# Patient Record
Sex: FEMALE | Race: WHITE | NOT HISPANIC OR LATINO | Employment: FULL TIME | URBAN - METROPOLITAN AREA
[De-identification: names, ages, dates, MRNs, and addresses within clinical notes are randomized per-mention and may not be internally consistent; named-entity substitution may affect disease eponyms.]

---

## 2023-06-12 ENCOUNTER — OFFICE VISIT (OUTPATIENT)
Dept: PRIMARY CARE | Facility: CLINIC | Age: 33
End: 2023-06-12
Payer: COMMERCIAL

## 2023-06-12 VITALS
HEIGHT: 65 IN | DIASTOLIC BLOOD PRESSURE: 71 MMHG | WEIGHT: 139 LBS | SYSTOLIC BLOOD PRESSURE: 114 MMHG | BODY MASS INDEX: 23.16 KG/M2 | HEART RATE: 41 BPM | OXYGEN SATURATION: 98 %

## 2023-06-12 DIAGNOSIS — G80.9 CEREBRAL PALSY, UNSPECIFIED TYPE (MULTI): Primary | ICD-10-CM

## 2023-06-12 DIAGNOSIS — F41.9 ANXIETY: ICD-10-CM

## 2023-06-12 PROCEDURE — 99214 OFFICE O/P EST MOD 30 MIN: CPT | Performed by: FAMILY MEDICINE

## 2023-06-12 RX ORDER — CLONAZEPAM 0.5 MG/1
0.5 TABLET ORAL 2 TIMES DAILY
COMMUNITY
End: 2023-06-12 | Stop reason: SDUPTHER

## 2023-06-12 RX ORDER — CLONAZEPAM 0.5 MG/1
0.5 TABLET ORAL 2 TIMES DAILY
Qty: 180 TABLET | Refills: 2 | Status: SHIPPED | OUTPATIENT
Start: 2023-06-12 | End: 2024-02-20 | Stop reason: SDUPTHER

## 2023-06-12 ASSESSMENT — ENCOUNTER SYMPTOMS
NUMBNESS: 0
RESPIRATORY NEGATIVE: 1
HEADACHES: 0
TREMORS: 0
CARDIOVASCULAR NEGATIVE: 1
SEIZURES: 0
CONSTITUTIONAL NEGATIVE: 1
WEAKNESS: 1
LIGHT-HEADEDNESS: 0
SPEECH DIFFICULTY: 0
GASTROINTESTINAL NEGATIVE: 1
FACIAL ASYMMETRY: 0
MUSCULOSKELETAL NEGATIVE: 1
PSYCHIATRIC NEGATIVE: 1

## 2023-06-12 NOTE — PROGRESS NOTES
Subjective   Patient ID: Kaylah Allen is a 32 y.o. female.    HPI  Patient has a longstanding history of anxiety we have been treating her with Klonopin for years patient is doing quite well continues to function and thrive as a young mother.  No new complaints.    Patient has a history of CP in the past is little concerned that she is losing some muscle strength however to send her over for neurologic evaluation  Review of Systems   Constitutional: Negative.    HENT: Negative.     Respiratory: Negative.     Cardiovascular: Negative.    Gastrointestinal: Negative.    Genitourinary: Negative.    Musculoskeletal: Negative.    Neurological:  Positive for weakness. Negative for tremors, seizures, syncope, facial asymmetry, speech difficulty, light-headedness, numbness and headaches.   Psychiatric/Behavioral: Negative.         Objective   Physical Exam  General no acute process no icterus well-hydrated alert active oriented    HEENT normocephalic no palpable tenderness eyes pupils equal reactive light and accommodation extraocular muscles intact no icterus and/or erythema ears benign external auditory canal no gross deformities nose no discharge drainage erythema bleeding throat no erythema.    Heart regular rate and rhythm without S3-S4 or murmur    Lungs clear to auscultation x2 no rales or rhonchi    Abdomen soft nontender nondistended no palpable masses no organomegaly splenomegaly.    Integument no rash no lumps bumps or concerning lesions.    Neurologic no tics tremors or seizures no decreased range of motion or ataxia.    Musculoskeletal good range of motion no gross abnormalities noted  Assessment/Plan   Diagnoses and all orders for this visit:  Cerebral palsy, unspecified type (CMS/HCC)  -     Referral to Neurology; Future

## 2023-08-28 ENCOUNTER — LAB (OUTPATIENT)
Dept: LAB | Facility: LAB | Age: 33
End: 2023-08-28
Payer: COMMERCIAL

## 2023-08-28 ENCOUNTER — OFFICE VISIT (OUTPATIENT)
Dept: PRIMARY CARE | Facility: CLINIC | Age: 33
End: 2023-08-28
Payer: COMMERCIAL

## 2023-08-28 VITALS
WEIGHT: 128 LBS | SYSTOLIC BLOOD PRESSURE: 117 MMHG | BODY MASS INDEX: 21.33 KG/M2 | HEIGHT: 65 IN | OXYGEN SATURATION: 98 % | HEART RATE: 84 BPM | DIASTOLIC BLOOD PRESSURE: 80 MMHG

## 2023-08-28 DIAGNOSIS — R53.81 MALAISE AND FATIGUE: ICD-10-CM

## 2023-08-28 DIAGNOSIS — R63.4 WEIGHT LOSS: ICD-10-CM

## 2023-08-28 DIAGNOSIS — R53.81 MALAISE AND FATIGUE: Primary | ICD-10-CM

## 2023-08-28 DIAGNOSIS — R19.7 DIARRHEA, UNSPECIFIED TYPE: ICD-10-CM

## 2023-08-28 DIAGNOSIS — R53.83 MALAISE AND FATIGUE: ICD-10-CM

## 2023-08-28 DIAGNOSIS — R53.83 MALAISE AND FATIGUE: Primary | ICD-10-CM

## 2023-08-28 LAB
ALANINE AMINOTRANSFERASE (SGPT) (U/L) IN SER/PLAS: 10 U/L (ref 7–45)
ALBUMIN (G/DL) IN SER/PLAS: 4.8 G/DL (ref 3.4–5)
ALKALINE PHOSPHATASE (U/L) IN SER/PLAS: 45 U/L (ref 33–110)
AMYLASE (U/L) IN SER/PLAS: 34 U/L (ref 29–103)
ANION GAP IN SER/PLAS: 13 MMOL/L (ref 10–20)
ASPARTATE AMINOTRANSFERASE (SGOT) (U/L) IN SER/PLAS: 13 U/L (ref 9–39)
BASOPHILS (10*3/UL) IN BLOOD BY AUTOMATED COUNT: 0.02 X10E9/L (ref 0–0.1)
BASOPHILS/100 LEUKOCYTES IN BLOOD BY AUTOMATED COUNT: 0.4 % (ref 0–2)
BILIRUBIN DIRECT (MG/DL) IN SER/PLAS: 0.1 MG/DL (ref 0–0.3)
BILIRUBIN TOTAL (MG/DL) IN SER/PLAS: 0.7 MG/DL (ref 0–1.2)
CALCIUM (MG/DL) IN SER/PLAS: 9.8 MG/DL (ref 8.6–10.6)
CARBON DIOXIDE, TOTAL (MMOL/L) IN SER/PLAS: 26 MMOL/L (ref 21–32)
CHLORIDE (MMOL/L) IN SER/PLAS: 103 MMOL/L (ref 98–107)
CREATININE (MG/DL) IN SER/PLAS: 0.71 MG/DL (ref 0.5–1.05)
EOSINOPHILS (10*3/UL) IN BLOOD BY AUTOMATED COUNT: 0.09 X10E9/L (ref 0–0.7)
EOSINOPHILS/100 LEUKOCYTES IN BLOOD BY AUTOMATED COUNT: 1.7 % (ref 0–6)
ERYTHROCYTE DISTRIBUTION WIDTH (RATIO) BY AUTOMATED COUNT: 12.2 % (ref 11.5–14.5)
ERYTHROCYTE MEAN CORPUSCULAR HEMOGLOBIN CONCENTRATION (G/DL) BY AUTOMATED: 33.8 G/DL (ref 32–36)
ERYTHROCYTE MEAN CORPUSCULAR VOLUME (FL) BY AUTOMATED COUNT: 96 FL (ref 80–100)
ERYTHROCYTES (10*6/UL) IN BLOOD BY AUTOMATED COUNT: 4.14 X10E12/L (ref 4–5.2)
GFR FEMALE: >90 ML/MIN/1.73M2
GLUCOSE (MG/DL) IN SER/PLAS: 95 MG/DL (ref 74–99)
HEMATOCRIT (%) IN BLOOD BY AUTOMATED COUNT: 39.9 % (ref 36–46)
HEMOGLOBIN (G/DL) IN BLOOD: 13.5 G/DL (ref 12–16)
IMMATURE GRANULOCYTES/100 LEUKOCYTES IN BLOOD BY AUTOMATED COUNT: 0.2 % (ref 0–0.9)
LEUKOCYTES (10*3/UL) IN BLOOD BY AUTOMATED COUNT: 5.2 X10E9/L (ref 4.4–11.3)
LIPASE (U/L) IN SER/PLAS: 16 U/L (ref 9–82)
LYMPHOCYTES (10*3/UL) IN BLOOD BY AUTOMATED COUNT: 2.21 X10E9/L (ref 1.2–4.8)
LYMPHOCYTES/100 LEUKOCYTES IN BLOOD BY AUTOMATED COUNT: 42.3 % (ref 13–44)
MONOCYTES (10*3/UL) IN BLOOD BY AUTOMATED COUNT: 0.43 X10E9/L (ref 0.1–1)
MONOCYTES/100 LEUKOCYTES IN BLOOD BY AUTOMATED COUNT: 8.2 % (ref 2–10)
NEUTROPHILS (10*3/UL) IN BLOOD BY AUTOMATED COUNT: 2.47 X10E9/L (ref 1.2–7.7)
NEUTROPHILS/100 LEUKOCYTES IN BLOOD BY AUTOMATED COUNT: 47.2 % (ref 40–80)
NRBC (PER 100 WBCS) BY AUTOMATED COUNT: 0 /100 WBC (ref 0–0)
PLATELETS (10*3/UL) IN BLOOD AUTOMATED COUNT: 312 X10E9/L (ref 150–450)
POTASSIUM (MMOL/L) IN SER/PLAS: 4.2 MMOL/L (ref 3.5–5.3)
PROTEIN TOTAL: 7.8 G/DL (ref 6.4–8.2)
SODIUM (MMOL/L) IN SER/PLAS: 138 MMOL/L (ref 136–145)
THYROTROPIN (MIU/L) IN SER/PLAS BY DETECTION LIMIT <= 0.05 MIU/L: 1.45 MIU/L (ref 0.44–3.98)
UREA NITROGEN (MG/DL) IN SER/PLAS: 18 MG/DL (ref 6–23)

## 2023-08-28 PROCEDURE — 84443 ASSAY THYROID STIM HORMONE: CPT

## 2023-08-28 PROCEDURE — 36415 COLL VENOUS BLD VENIPUNCTURE: CPT

## 2023-08-28 PROCEDURE — 80053 COMPREHEN METABOLIC PANEL: CPT

## 2023-08-28 PROCEDURE — 83690 ASSAY OF LIPASE: CPT

## 2023-08-28 PROCEDURE — 85025 COMPLETE CBC W/AUTO DIFF WBC: CPT

## 2023-08-28 PROCEDURE — 82150 ASSAY OF AMYLASE: CPT

## 2023-08-28 PROCEDURE — 99214 OFFICE O/P EST MOD 30 MIN: CPT | Performed by: FAMILY MEDICINE

## 2023-08-28 PROCEDURE — 82248 BILIRUBIN DIRECT: CPT

## 2023-08-28 ASSESSMENT — ENCOUNTER SYMPTOMS
CARDIOVASCULAR NEGATIVE: 1
VOMITING: 0
MUSCULOSKELETAL NEGATIVE: 1
UNEXPECTED WEIGHT CHANGE: 1
RESPIRATORY NEGATIVE: 1
NERVOUS/ANXIOUS: 1
FATIGUE: 1
ABDOMINAL PAIN: 1
DIARRHEA: 1
EYES NEGATIVE: 1
BLOOD IN STOOL: 1
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
## 2023-09-17 PROBLEM — F32.A DEPRESSIVE DISORDER: Status: ACTIVE | Noted: 2019-04-15

## 2023-09-17 PROBLEM — R00.2 PALPITATION: Status: ACTIVE | Noted: 2023-09-17

## 2023-09-17 PROBLEM — Q17.9 EAR DEFORMITY: Status: ACTIVE | Noted: 2019-03-21

## 2023-09-17 PROBLEM — R06.00 DYSPNEA: Status: ACTIVE | Noted: 2023-09-17

## 2023-09-17 PROBLEM — R00.1 BRADYCARDIA, SINUS: Status: ACTIVE | Noted: 2023-09-17

## 2023-09-17 PROBLEM — M54.2 SPINE PAIN, CERVICAL: Status: ACTIVE | Noted: 2023-09-17

## 2023-09-17 PROBLEM — R13.10 DYSPHAGIA: Status: ACTIVE | Noted: 2023-09-17

## 2023-09-17 PROBLEM — F41.9 ANXIETY DISORDER: Status: ACTIVE | Noted: 2023-09-17

## 2023-09-17 PROBLEM — D22.9 NUMEROUS SKIN MOLES: Status: ACTIVE | Noted: 2022-01-12

## 2023-09-17 PROBLEM — F41.1 ANXIETY STATE: Status: ACTIVE | Noted: 2019-04-15

## 2023-09-17 PROBLEM — R53.83 FATIGUE: Status: ACTIVE | Noted: 2022-01-12

## 2023-09-17 PROBLEM — M41.9 SCOLIOSIS: Status: ACTIVE | Noted: 2023-09-17

## 2023-09-17 PROBLEM — K25.9 GASTRIC ULCER WITHOUT HEMORRHAGE, PERFORATION, OR OBSTRUCTION: Status: ACTIVE | Noted: 2019-04-15

## 2023-09-17 RX ORDER — MULTIVITAMIN
TABLET ORAL
COMMUNITY

## 2023-10-23 ENCOUNTER — OFFICE VISIT (OUTPATIENT)
Dept: PRIMARY CARE | Facility: CLINIC | Age: 33
End: 2023-10-23
Payer: COMMERCIAL

## 2023-10-23 VITALS
WEIGHT: 152.6 LBS | DIASTOLIC BLOOD PRESSURE: 76 MMHG | SYSTOLIC BLOOD PRESSURE: 119 MMHG | BODY MASS INDEX: 25.39 KG/M2 | HEART RATE: 77 BPM | OXYGEN SATURATION: 98 % | TEMPERATURE: 97.9 F

## 2023-10-23 DIAGNOSIS — R10.9 FLANK PAIN: Primary | ICD-10-CM

## 2023-10-23 DIAGNOSIS — G80.9 CEREBRAL PALSY, UNSPECIFIED TYPE (MULTI): ICD-10-CM

## 2023-10-23 PROCEDURE — 99213 OFFICE O/P EST LOW 20 MIN: CPT | Performed by: FAMILY MEDICINE

## 2023-10-23 ASSESSMENT — PATIENT HEALTH QUESTIONNAIRE - PHQ9
1. LITTLE INTEREST OR PLEASURE IN DOING THINGS: NOT AT ALL
2. FEELING DOWN, DEPRESSED OR HOPELESS: NOT AT ALL
SUM OF ALL RESPONSES TO PHQ9 QUESTIONS 1 AND 2: 0

## 2023-10-23 ASSESSMENT — ENCOUNTER SYMPTOMS
BACK PAIN: 1
GASTROINTESTINAL NEGATIVE: 1
FLANK PAIN: 1
RESPIRATORY NEGATIVE: 1
CONSTITUTIONAL NEGATIVE: 1
CARDIOVASCULAR NEGATIVE: 1

## 2023-10-23 NOTE — PROGRESS NOTES
Subjective   Patient ID: Ania Allen is a 33 y.o. female who presents for Follow-up (Pt following up from Urgent care. No UTI but having kidney pain in her left kidney.).  HPI  Pt with l sided flank pain recurrent uti patient having recurrent flank pain and has had a couple UTIs in the past.  At this point no pyuria hematuria or dysuria.    Patient was at the urgent care this morning evaluation was flank pain today she has some costovertebral angle pain on the right palpable tenderness of the musculoskeletal thoracic muscles on the right.  Patient is concerned with recurrent urinary tract infections we will get a CT scan to look for stones look for any other abnormalities of the kidneys themselves.  Review of Systems   Constitutional: Negative.    HENT: Negative.     Respiratory: Negative.     Cardiovascular: Negative.    Gastrointestinal: Negative.    Genitourinary:  Positive for flank pain.   Musculoskeletal:  Positive for back pain.       Objective   Physical Exam  General no acute process no icterus well-hydrated alert active oriented    HEENT normocephalic no palpable tenderness eyes pupils equal reactive light and accommodation extraocular muscles intact no icterus and/or erythema ears benign external auditory canal no gross deformities nose no discharge drainage erythema bleeding throat no erythema.    Heart regular rate and rhythm without S3-S4 or murmur    Lungs clear to auscultation x2 no rales or rhonchi    Abdomen soft nontender nondistended no palpable masses no organomegaly splenomegaly.    Integument no rash no lumps bumps or concerning lesions.    Neurologic no tics tremors or seizures no decreased range of motion or ataxia.    Musculoskeletal good range of motion no gross abnormalities noted    Patient has some difficulty with left lower extremity secondary to history of CP  Assessment/Plan   Diagnoses and all orders for this visit:  Flank pain  -     CT abdomen pelvis wo IV contrast;  Future  Cerebral palsy, unspecified type (CMS/HCC)

## 2023-11-27 ENCOUNTER — OFFICE VISIT (OUTPATIENT)
Dept: NEUROLOGY | Facility: CLINIC | Age: 33
End: 2023-11-27
Payer: COMMERCIAL

## 2023-11-27 VITALS
HEIGHT: 65 IN | BODY MASS INDEX: 25.33 KG/M2 | TEMPERATURE: 97.8 F | SYSTOLIC BLOOD PRESSURE: 129 MMHG | RESPIRATION RATE: 16 BRPM | WEIGHT: 152 LBS | HEART RATE: 74 BPM | DIASTOLIC BLOOD PRESSURE: 79 MMHG

## 2023-11-27 DIAGNOSIS — R20.0 LEFT SIDED NUMBNESS: Primary | ICD-10-CM

## 2023-11-27 DIAGNOSIS — G81.14 SPASTIC HEMIPARESIS OF LEFT NONDOMINANT SIDE (MULTI): ICD-10-CM

## 2023-11-27 DIAGNOSIS — G80.8 OTHER CEREBRAL PALSY (MULTI): ICD-10-CM

## 2023-11-27 PROCEDURE — 99204 OFFICE O/P NEW MOD 45 MIN: CPT | Performed by: PSYCHIATRY & NEUROLOGY

## 2023-11-27 RX ORDER — BACLOFEN 5 MG/1
5 TABLET ORAL NIGHTLY
Qty: 30 TABLET | Refills: 2 | Status: SHIPPED | OUTPATIENT
Start: 2023-11-27

## 2023-11-27 ASSESSMENT — PAIN SCALES - GENERAL: PAINLEVEL: 0-NO PAIN

## 2023-11-27 NOTE — PROGRESS NOTES
Consulting Physician: Allen Corona    Chief Complaint: Cerebral Palsy    History Of Present Illness  Ania Allen is a 33 y.o. female presenting with cerebral palsy.    The patient has cerebral palsy. It affects her left leg and left hand.  Her left foot is in a fixed position.  Her left hand is weaker. About 1 year ago, she developed episodes of left face, arm, and leg tingling . The episodes last 24 hours and occur a few times per month. She does feel weaker on the left side during the episodes. She denies any double vision, slurred speech, or left facial droop.  She did have seizures when she was an infant.  She denies any seizures currently.  She does have chronic memory difficulty.  Her level of education is up to 12th grade.       Past Medical History  Past Medical History:   Diagnosis Date    Personal history of other diseases of the musculoskeletal system and connective tissue     History of scoliosis    Personal history of other diseases of the nervous system and sense organs     History of cerebral palsy    Personal history of other mental and behavioral disorders     History of panic disorder       Surgical History  Past Surgical History:   Procedure Laterality Date    OTHER SURGICAL HISTORY  07/13/2021    Leg surgery       Family History  Family History   Problem Relation Name Age of Onset    Hypothyroidism Mother      Diabetes Father          Social History   reports that she has been smoking. She has never used smokeless tobacco. She reports that she does not drink alcohol and does not use drugs.     Allergies  Patient has no known allergies.    Medications    Current Outpatient Medications:     clonazePAM (KlonoPIN) 0.5 mg tablet, Take 1 tablet (0.5 mg) by mouth 2 times a day., Disp: 180 tablet, Rfl: 2    multivitamin (Daily Multi-Vitamin) tablet, Take by mouth., Disp: , Rfl:       Last Recorded Vitals   Blood pressure 129/79, pulse 74, temperature 36.6 °C (97.8 °F), temperature source  "Temporal, resp. rate 16, height 1.651 m (5' 5\"), weight 68.9 kg (152 lb).    Objective:  Gen: NAD  Neuro:  --HIF: A&O X 3, repetition and naming intact  --CN:  PERRLA, EOMI, VFF, no visible facial asymmetry, facial sensation intact, no tongue or palatal deviation, SCM intact  --Motor: Moves all 4 extremities except the following: LUE: FDI 3/5; LLE: hip flexion 3, knee extension 3, knee flexion 4, DF 2, PF 2, inversion 0, eversion 0  --Sensory: Intact to light touch, intact to pinprick  --Reflex: brisk on the left  --Cerebellum: FTN and HTS intact  --Gait: Paretic Gait    Relevant Results  Lab Results   Component Value Date    WBC 5.2 08/28/2023    HGB 13.5 08/28/2023    HCT 39.9 08/28/2023    MCV 96 08/28/2023     08/28/2023       Lab Results   Component Value Date    GLUCOSE 95 08/28/2023    CALCIUM 9.8 08/28/2023     08/28/2023    K 4.2 08/28/2023    CO2 26 08/28/2023     08/28/2023    BUN 18 08/28/2023    CREATININE 0.71 08/28/2023       No results found for: \"HGBA1C\"    Lab Results   Component Value Date    CHOL 140 07/19/2022     Lab Results   Component Value Date    HDL 42.0 07/19/2022     No results found for: \"LDLCALC\"  Lab Results   Component Value Date    TRIG 83 07/19/2022     No components found for: \"CHOLHDL\"       Assessment:  This is a 33 year old female with cerebral palsy resulting in spastic left hemiparesis presenting for evaluation of left sided numbness and tingling.  About 1 year ago, she developed episodes of numbness/tingling affecting her left face, arm, and leg lasting 24 hours.  The episodes occur twice per month.  On exam, she is noted to have spastic left hemiparesis as described above.     Left Sided Numbness and Tingling  - recommend MRI Brain    2.  Spastic Left Hemiparesis, secondary to CP  - recommend trial of Baclofen 5 mg at bedtime    Follow up after above,        Regulo Britt MD  [facility]  Department of Neurology      A copy of this note was " sent to the referring provider.

## 2023-12-11 PROBLEM — M79.7 FIBROMYALGIA: Status: ACTIVE | Noted: 2023-12-11

## 2023-12-11 PROBLEM — N39.0 ACUTE UTI: Status: ACTIVE | Noted: 2023-09-07

## 2023-12-11 PROBLEM — N89.8 VAGINAL DISCHARGE: Status: ACTIVE | Noted: 2023-10-23

## 2023-12-11 PROBLEM — R35.0 INCREASED FREQUENCY OF URINATION: Status: ACTIVE | Noted: 2023-10-23

## 2023-12-11 PROBLEM — R10.9 ACUTE LEFT FLANK PAIN: Status: ACTIVE | Noted: 2023-09-07

## 2023-12-11 PROBLEM — U07.1 COVID-19: Status: ACTIVE | Noted: 2021-11-10

## 2023-12-12 ENCOUNTER — APPOINTMENT (OUTPATIENT)
Dept: CARDIOLOGY | Facility: CLINIC | Age: 33
End: 2023-12-12
Payer: COMMERCIAL

## 2023-12-12 ENCOUNTER — ANCILLARY PROCEDURE (OUTPATIENT)
Dept: RADIOLOGY | Facility: CLINIC | Age: 33
End: 2023-12-12
Payer: COMMERCIAL

## 2023-12-12 DIAGNOSIS — G81.14 SPASTIC HEMIPARESIS OF LEFT NONDOMINANT SIDE (MULTI): ICD-10-CM

## 2023-12-12 DIAGNOSIS — G80.8 OTHER CEREBRAL PALSY (MULTI): ICD-10-CM

## 2023-12-12 DIAGNOSIS — R20.0 LEFT SIDED NUMBNESS: Primary | ICD-10-CM

## 2023-12-12 PROCEDURE — 2550000001 HC RX 255 CONTRASTS: Performed by: FAMILY MEDICINE

## 2023-12-12 PROCEDURE — 70553 MRI BRAIN STEM W/O & W/DYE: CPT

## 2023-12-12 PROCEDURE — A9575 INJ GADOTERATE MEGLUMI 0.1ML: HCPCS | Performed by: FAMILY MEDICINE

## 2023-12-12 PROCEDURE — 70553 MRI BRAIN STEM W/O & W/DYE: CPT | Performed by: STUDENT IN AN ORGANIZED HEALTH CARE EDUCATION/TRAINING PROGRAM

## 2023-12-12 RX ORDER — GADOTERATE MEGLUMINE 376.9 MG/ML
13 INJECTION INTRAVENOUS
Status: COMPLETED | OUTPATIENT
Start: 2023-12-12 | End: 2023-12-12

## 2023-12-12 RX ADMIN — GADOTERATE MEGLUMINE 13 ML: 376.9 INJECTION INTRAVENOUS at 09:47

## 2023-12-14 ENCOUNTER — APPOINTMENT (OUTPATIENT)
Dept: PRIMARY CARE | Facility: CLINIC | Age: 33
End: 2023-12-14
Payer: COMMERCIAL

## 2023-12-22 ENCOUNTER — APPOINTMENT (OUTPATIENT)
Dept: PRIMARY CARE | Facility: CLINIC | Age: 33
End: 2023-12-22
Payer: COMMERCIAL

## 2024-02-20 ENCOUNTER — OFFICE VISIT (OUTPATIENT)
Dept: PRIMARY CARE | Facility: CLINIC | Age: 34
End: 2024-02-20
Payer: COMMERCIAL

## 2024-02-20 VITALS
HEART RATE: 96 BPM | HEIGHT: 65 IN | DIASTOLIC BLOOD PRESSURE: 78 MMHG | WEIGHT: 129 LBS | BODY MASS INDEX: 21.49 KG/M2 | SYSTOLIC BLOOD PRESSURE: 120 MMHG | OXYGEN SATURATION: 98 %

## 2024-02-20 DIAGNOSIS — F41.9 ANXIETY: ICD-10-CM

## 2024-02-20 PROCEDURE — 99213 OFFICE O/P EST LOW 20 MIN: CPT | Performed by: FAMILY MEDICINE

## 2024-02-20 RX ORDER — CLONAZEPAM 0.5 MG/1
0.5 TABLET ORAL 2 TIMES DAILY
Qty: 180 TABLET | Refills: 2 | Status: SHIPPED | OUTPATIENT
Start: 2024-02-20

## 2024-02-20 NOTE — PROGRESS NOTES
Subjective   Patient ID: Ania Allen is a 33 y.o. female who presents for Med Refill.  HPI  Patient doing well continues on medication has a follow-up with neurology for previous CVA probably at birthReview of Systems   Constitutional: Negative.    HENT: Negative.     Respiratory: Negative.     Cardiovascular: Negative.    Gastrointestinal: Negative.    Genitourinary:  neg  Musculoskeletal:       Objective   Physical Exam  General no acute process no icterus well-hydrated alert active oriented    HEENT normocephalic no palpable tenderness eyes pupils equal reactive light and accommodation extraocular muscles intact no icterus and/or erythema ears benign external auditory canal no gross deformities nose no discharge drainage erythema bleeding throat no erythema.    Heart regular rate and rhythm without S3-S4 or murmur    Lungs clear to auscultation x2 no rales or rhonchi    Abdomen soft nontender nondistended no palpable masses no organomegaly splenomegaly.    Integument no rash no lumps bumps or concerning lesions.    Neurologic no tics tremors or seizures no decreased range of motion or ataxia.    Musculoskeletal good range of motion no gross abnormalities noted    Patient has some difficulty with left lower extremity secondary to history of CP  Review of Systems    Objective   Physical Exam    Assessment/Plan            Allen Corona DO 02/20/24 8:43 AM

## 2024-09-14 DIAGNOSIS — F41.9 ANXIETY: ICD-10-CM

## 2024-09-16 RX ORDER — CLONAZEPAM 0.5 MG/1
0.5 TABLET ORAL 2 TIMES DAILY
Qty: 180 TABLET | Refills: 0 | Status: SHIPPED | OUTPATIENT
Start: 2024-09-16

## 2024-09-17 ENCOUNTER — APPOINTMENT (OUTPATIENT)
Dept: PRIMARY CARE | Facility: CLINIC | Age: 34
End: 2024-09-17
Payer: COMMERCIAL

## 2024-10-03 ENCOUNTER — APPOINTMENT (OUTPATIENT)
Dept: PRIMARY CARE | Facility: CLINIC | Age: 34
End: 2024-10-03
Payer: COMMERCIAL

## 2024-10-03 VITALS
SYSTOLIC BLOOD PRESSURE: 111 MMHG | HEIGHT: 65 IN | DIASTOLIC BLOOD PRESSURE: 76 MMHG | WEIGHT: 126 LBS | BODY MASS INDEX: 20.99 KG/M2 | OXYGEN SATURATION: 98 % | HEART RATE: 83 BPM

## 2024-10-03 DIAGNOSIS — F41.9 ANXIETY: ICD-10-CM

## 2024-10-03 PROCEDURE — 99214 OFFICE O/P EST MOD 30 MIN: CPT | Performed by: FAMILY MEDICINE

## 2024-10-03 PROCEDURE — 3008F BODY MASS INDEX DOCD: CPT | Performed by: FAMILY MEDICINE

## 2024-10-03 RX ORDER — OMEPRAZOLE 20 MG/1
20 CAPSULE, DELAYED RELEASE ORAL DAILY
Qty: 30 CAPSULE | Refills: 5 | Status: SHIPPED | OUTPATIENT
Start: 2024-10-03 | End: 2025-04-01

## 2024-10-03 NOTE — PROGRESS NOTES
Subjective   Patient ID: Ania Allen is a 33 y.o. female who presents for Med Refill and Follow-up (Colonoscopy and endoscopy ).  HPI  Patient to follow-up with gastroenterology as well as to follow-up with her history of anxiety.    Patient had some weight loss is undergoing a GI workup.  Patient still having anxiety continues to take the Klonopin without needing any more in the last patient seems stable with it at this point  Review of Systems    Objective   Physical Exam    Assessment/Plan   Problem List Items Addressed This Visit    None  Visit Diagnoses         Codes    Anxiety     F41.9    Relevant Medications    omeprazole (PriLOSEC) 20 mg DR capsule             Constitutional: Negative.    HENT: Negative.     Respiratory: Negative.     Cardiovascular: Negative.    Gastrointestinal: Negative.    Genitourinary:  neg  Musculoskeletal:       Objective   Physical Exam  General no acute process no icterus well-hydrated alert active oriented    HEENT normocephalic no palpable tenderness eyes pupils equal reactive light and accommodation extraocular muscles intact no icterus and/or erythema ears benign external auditory canal no gross deformities nose no discharge drainage erythema bleeding throat no erythema.    Heart regular rate and rhythm without S3-S4 or murmur    Lungs clear to auscultation x2 no rales or rhonchi    Abdomen soft nontender nondistended no palpable masses no organomegaly splenomegaly.    Integument no rash no lumps bumps or concerning lesions.    Neurologic no tics tremors or seizures no decreased range of motion or ataxia.    Musculoskeletal good range of motion no gross abnormalities noted      Allen Corona DO 10/14/24 6:55 PM

## 2024-12-06 DIAGNOSIS — F41.9 ANXIETY: ICD-10-CM

## 2024-12-06 RX ORDER — CLONAZEPAM 0.5 MG/1
0.5 TABLET ORAL 2 TIMES DAILY
Qty: 180 TABLET | Refills: 0 | Status: SHIPPED | OUTPATIENT
Start: 2024-12-06

## 2024-12-06 NOTE — TELEPHONE ENCOUNTER
Rx Refill Request Telephone Encounter    Name:  Ania Allen  :  [id number]  Medication Name:  Clonazepam 0.5 mg tablet  Dose : 0.5 mg   Route : oral   Frequency : 2 times daily   Quantity : 180 tablet   Directions :  TAKE 1 TABLET (0.5 MG) BY MOUTH 2 TIMES A DAY.  Specific Pharmacy location:  [facility]

## 2025-02-27 DIAGNOSIS — F41.9 ANXIETY: ICD-10-CM

## 2025-03-01 RX ORDER — CLONAZEPAM 0.5 MG/1
0.5 TABLET ORAL 2 TIMES DAILY
Qty: 180 TABLET | Refills: 0 | Status: SHIPPED | OUTPATIENT
Start: 2025-03-01

## 2025-03-13 DIAGNOSIS — F41.9 ANXIETY: ICD-10-CM

## 2025-03-13 RX ORDER — CLONAZEPAM 0.5 MG/1
0.5 TABLET ORAL 2 TIMES DAILY
Qty: 180 TABLET | Refills: 0 | Status: SHIPPED | OUTPATIENT
Start: 2025-03-13

## 2025-06-16 DIAGNOSIS — F41.9 ANXIETY: ICD-10-CM

## 2025-06-16 RX ORDER — CLONAZEPAM 0.5 MG/1
0.5 TABLET ORAL 2 TIMES DAILY
Qty: 180 TABLET | Refills: 0 | OUTPATIENT
Start: 2025-06-16

## 2025-08-05 ENCOUNTER — APPOINTMENT (OUTPATIENT)
Dept: PRIMARY CARE | Facility: CLINIC | Age: 35
End: 2025-08-05
Payer: COMMERCIAL

## 2025-08-05 ENCOUNTER — TELEPHONE (OUTPATIENT)
Dept: PRIMARY CARE | Facility: CLINIC | Age: 35
End: 2025-08-05

## 2025-08-05 VITALS
WEIGHT: 130.2 LBS | SYSTOLIC BLOOD PRESSURE: 111 MMHG | BODY MASS INDEX: 21.69 KG/M2 | HEART RATE: 59 BPM | OXYGEN SATURATION: 98 % | HEIGHT: 65 IN | DIASTOLIC BLOOD PRESSURE: 71 MMHG

## 2025-08-05 DIAGNOSIS — G80.1 SPASTIC CEREBRAL PALSY (MULTI): ICD-10-CM

## 2025-08-05 DIAGNOSIS — F41.9 ANXIETY: ICD-10-CM

## 2025-08-05 DIAGNOSIS — G80.9 CEREBRAL PALSY, UNSPECIFIED TYPE (MULTI): ICD-10-CM

## 2025-08-05 DIAGNOSIS — G80.8 OTHER CEREBRAL PALSY: ICD-10-CM

## 2025-08-05 DIAGNOSIS — Z00.00 PHYSICAL EXAM: Primary | ICD-10-CM

## 2025-08-05 PROCEDURE — 3008F BODY MASS INDEX DOCD: CPT | Performed by: FAMILY MEDICINE

## 2025-08-05 PROCEDURE — 99395 PREV VISIT EST AGE 18-39: CPT | Performed by: FAMILY MEDICINE

## 2025-08-05 RX ORDER — CLONAZEPAM 0.5 MG/1
0.5 TABLET ORAL 2 TIMES DAILY
Qty: 180 TABLET | Refills: 0 | Status: SHIPPED | OUTPATIENT
Start: 2025-08-05

## 2025-08-05 RX ORDER — OMEPRAZOLE 20 MG/1
20 CAPSULE, DELAYED RELEASE ORAL DAILY
Qty: 90 CAPSULE | Refills: 1 | Status: SHIPPED | OUTPATIENT
Start: 2025-08-05 | End: 2026-02-01

## 2025-08-05 ASSESSMENT — ENCOUNTER SYMPTOMS
GASTROINTESTINAL NEGATIVE: 1
CARDIOVASCULAR NEGATIVE: 1
RESPIRATORY NEGATIVE: 1
CONSTITUTIONAL NEGATIVE: 1

## 2025-08-05 ASSESSMENT — COLUMBIA-SUICIDE SEVERITY RATING SCALE - C-SSRS
2. HAVE YOU ACTUALLY HAD ANY THOUGHTS OF KILLING YOURSELF?: NO
1. IN THE PAST MONTH, HAVE YOU WISHED YOU WERE DEAD OR WISHED YOU COULD GO TO SLEEP AND NOT WAKE UP?: NO
6. HAVE YOU EVER DONE ANYTHING, STARTED TO DO ANYTHING, OR PREPARED TO DO ANYTHING TO END YOUR LIFE?: NO

## 2025-08-05 ASSESSMENT — PATIENT HEALTH QUESTIONNAIRE - PHQ9
SUM OF ALL RESPONSES TO PHQ9 QUESTIONS 1 AND 2: 0
2. FEELING DOWN, DEPRESSED OR HOPELESS: NOT AT ALL
1. LITTLE INTEREST OR PLEASURE IN DOING THINGS: NOT AT ALL

## 2025-08-05 NOTE — TELEPHONE ENCOUNTER
Rx Refill Request Telephone Encounter    Name:  Ania Allen  :  [id number]  Medication Name: Clonazepam 0.5 MG        #180  Take 1 tablet twice daily  Specific Pharmacy location: [facility]  Date of last appointment: 25  Date of next appointment:   Best number to reach patient:

## 2025-08-05 NOTE — PROGRESS NOTES
Subjective   Patient ID: Ania Allen is a 34 y.o. female who presents for Annual Exam, Med Management, and Earache (Right ear x 4 days).  HPI    Review of Systems   Constitutional: Negative.    HENT: Negative.     Respiratory: Negative.     Cardiovascular: Negative.    Gastrointestinal: Negative.        Objective   Physical Exam  Constitutional:       Appearance: Normal appearance.   HENT:      Head: Normocephalic.      Mouth/Throat:      Mouth: Mucous membranes are moist.     Eyes:      Extraocular Movements: Extraocular movements intact.      Pupils: Pupils are equal, round, and reactive to light.       Cardiovascular:      Rate and Rhythm: Normal rate and regular rhythm.   Pulmonary:      Effort: Pulmonary effort is normal.     Musculoskeletal:         General: Normal range of motion.     Skin:     General: Skin is warm and dry.     Neurological:      Mental Status: She is alert.         Assessment/Plan   Problem List Items Addressed This Visit    None  Visit Diagnoses         Codes      Physical exam    -  Primary Z00.00    Relevant Orders    CBC    Comprehensive metabolic panel      Anxiety     F41.9    Relevant Medications    omeprazole (PriLOSEC) 20 mg DR estella Corona DO 08/05/25 2:47 PM